# Patient Record
Sex: FEMALE | Race: BLACK OR AFRICAN AMERICAN | NOT HISPANIC OR LATINO | URBAN - METROPOLITAN AREA
[De-identification: names, ages, dates, MRNs, and addresses within clinical notes are randomized per-mention and may not be internally consistent; named-entity substitution may affect disease eponyms.]

---

## 2023-11-07 ENCOUNTER — ANCILLARY PROCEDURE (OUTPATIENT)
Dept: RADIOLOGY | Facility: CLINIC | Age: 73
End: 2023-11-07
Payer: MEDICARE

## 2023-11-07 DIAGNOSIS — Z12.31 ENCOUNTER FOR SCREENING MAMMOGRAM FOR MALIGNANT NEOPLASM OF BREAST: ICD-10-CM

## 2023-11-07 PROCEDURE — 77067 SCR MAMMO BI INCL CAD: CPT | Performed by: RADIOLOGY

## 2023-11-07 PROCEDURE — 77063 BREAST TOMOSYNTHESIS BI: CPT | Performed by: RADIOLOGY

## 2023-11-07 PROCEDURE — 77067 SCR MAMMO BI INCL CAD: CPT

## 2023-11-07 PROCEDURE — 77063 BREAST TOMOSYNTHESIS BI: CPT

## 2024-08-26 ENCOUNTER — HOSPITAL ENCOUNTER (EMERGENCY)
Facility: HOSPITAL | Age: 74
Discharge: HOME | End: 2024-08-26
Attending: EMERGENCY MEDICINE
Payer: COMMERCIAL

## 2024-08-26 VITALS
WEIGHT: 156 LBS | RESPIRATION RATE: 20 BRPM | HEIGHT: 59 IN | SYSTOLIC BLOOD PRESSURE: 116 MMHG | HEART RATE: 60 BPM | DIASTOLIC BLOOD PRESSURE: 67 MMHG | BODY MASS INDEX: 31.45 KG/M2 | OXYGEN SATURATION: 98 % | TEMPERATURE: 98.6 F

## 2024-08-26 DIAGNOSIS — U07.1 COVID: Primary | ICD-10-CM

## 2024-08-26 LAB
FLUAV RNA RESP QL NAA+PROBE: NOT DETECTED
FLUBV RNA RESP QL NAA+PROBE: NOT DETECTED
SARS-COV-2 RNA RESP QL NAA+PROBE: DETECTED

## 2024-08-26 PROCEDURE — 99283 EMERGENCY DEPT VISIT LOW MDM: CPT | Performed by: EMERGENCY MEDICINE

## 2024-08-26 PROCEDURE — 87636 SARSCOV2 & INF A&B AMP PRB: CPT | Performed by: EMERGENCY MEDICINE

## 2024-08-26 ASSESSMENT — COLUMBIA-SUICIDE SEVERITY RATING SCALE - C-SSRS
2. HAVE YOU ACTUALLY HAD ANY THOUGHTS OF KILLING YOURSELF?: NO
1. IN THE PAST MONTH, HAVE YOU WISHED YOU WERE DEAD OR WISHED YOU COULD GO TO SLEEP AND NOT WAKE UP?: NO
6. HAVE YOU EVER DONE ANYTHING, STARTED TO DO ANYTHING, OR PREPARED TO DO ANYTHING TO END YOUR LIFE?: NO

## 2024-08-26 ASSESSMENT — LIFESTYLE VARIABLES
HAVE PEOPLE ANNOYED YOU BY CRITICIZING YOUR DRINKING: NO
HAVE YOU EVER FELT YOU SHOULD CUT DOWN ON YOUR DRINKING: NO
TOTAL SCORE: 0
EVER HAD A DRINK FIRST THING IN THE MORNING TO STEADY YOUR NERVES TO GET RID OF A HANGOVER: NO
EVER FELT BAD OR GUILTY ABOUT YOUR DRINKING: NO

## 2024-08-26 ASSESSMENT — PAIN SCALES - GENERAL: PAINLEVEL_OUTOF10: 0 - NO PAIN

## 2024-08-26 ASSESSMENT — PAIN - FUNCTIONAL ASSESSMENT: PAIN_FUNCTIONAL_ASSESSMENT: 0-10

## 2024-08-26 NOTE — ED TRIAGE NOTES
Patient presented for eval of covid.  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  She doubts the integrity of the facility that tested her and she wants a second opinion.  She is here for testing, she is asymptomatic at this time.

## 2024-08-26 NOTE — Clinical Note
Maryam Kahn was seen and treated in our emergency department on 8/26/2024.  She may return to work on 09/02/2024.  Patient has covid, able to return to work next week     If you have any questions or concerns, please don't hesitate to call.      Jayda Kay MD

## 2024-08-26 NOTE — ED PROVIDER NOTES
HPI   Chief Complaint   Patient presents with    Covid-19 Testing     asymtomatic       HPI  73 yo with PMH of anemia, hemorrhoid for COVID testing. She reports she works at a  and coworker was tested for COVID and was positive, she reports she wants to be tested to rule out COVID. She reports she last had contact with a person last Thursday. She was informed that her coworker was positive for COVID today. Patient denies fever, productive cough, shortness of breath, chest pain, abdominal pain.       Patient History   Past Medical History:   Diagnosis Date    Calculus of gallbladder without cholecystitis without obstruction 2017    Gallstones    Other conditions influencing health status 2017    History of cough    Personal history of other diseases of the digestive system 2021    History of rectal bleeding    Personal history of other diseases of the musculoskeletal system and connective tissue 2017    History of arthritis     Past Surgical History:   Procedure Laterality Date    BREAST BIOPSY Right     benign     SECTION, CLASSIC  2017     Section    OTHER SURGICAL HISTORY  2021    Cholecystectomy     No family history on file.  Social History     Tobacco Use    Smoking status: Never    Smokeless tobacco: Never   Substance Use Topics    Alcohol use: Not on file    Drug use: Not on file       Physical Exam   ED Triage Vitals [24 1108]   Temperature Heart Rate Respirations BP   36.8 °C (98.2 °F) 67 16 129/67      Pulse Ox Temp Source Heart Rate Source Patient Position   98 % Temporal -- --      BP Location FiO2 (%)     -- --       Physical Exam  Vitals and nursing note reviewed.   Constitutional:       General: She is not in acute distress.     Appearance: She is well-developed.   HENT:      Head: Normocephalic and atraumatic.   Eyes:      Conjunctiva/sclera: Conjunctivae normal.   Cardiovascular:      Rate and Rhythm: Normal rate and regular rhythm.       Heart sounds: Normal heart sounds. No murmur heard.  Pulmonary:      Effort: Pulmonary effort is normal. No respiratory distress.      Breath sounds: Normal breath sounds.   Abdominal:      General: Abdomen is flat.      Tenderness: There is no abdominal tenderness.   Musculoskeletal:         General: No swelling.      Cervical back: Neck supple.      Right lower leg: No edema.      Left lower leg: No edema.   Skin:     General: Skin is warm.      Capillary Refill: Capillary refill takes less than 2 seconds.   Neurological:      Mental Status: She is alert.   Psychiatric:         Mood and Affect: Mood normal.           ED Course & MDM   ED Course as of 08/26/24 1316   Mon Aug 26, 2024   1312 Coronavirus 2019, PCR(!): Detected [AT]      ED Course User Index  [AT] Jayda Kay MD         Diagnoses as of 08/26/24 1316   COVID                 No data recorded     Gayle Coma Scale Score: 15 (08/26/24 1111 : Chip Ramirez RN)                           Medical Decision Making  Patient hemodynamically stable, satting well on room air. Patient has clear breath sounds, no fevers or shortness of breath. Less concerning for any pneumonia at this time. Patient denies any symptoms. COVID test ordered and was positive. Patient is not hypoxic, does not indicate need for steroid. Patient was exposed more than 48 hours, not candidate for Paxlovid. Walking pulse ox normal, no desats.    Patient given work note for return next week. Patient discharged and given return precautions.    Procedure  Procedures         Jayda Kay MD  Resident  08/26/24 1316

## 2024-08-26 NOTE — DISCHARGE INSTRUCTIONS
Please follow up with your primary care doctor.    Return if you have:  Temperature greater than 100.4  Persistent nausea and vomiting  Chest pain  Difficulty breathing, headache or visual changes  Any other questions or concerns you may have after discharge    In an emergency, call 911 or go to an Emergency Department at a nearby hospital